# Patient Record
Sex: FEMALE | Race: WHITE | URBAN - METROPOLITAN AREA
[De-identification: names, ages, dates, MRNs, and addresses within clinical notes are randomized per-mention and may not be internally consistent; named-entity substitution may affect disease eponyms.]

---

## 2021-07-15 ENCOUNTER — OFFICE VISIT (OUTPATIENT)
Dept: FAMILY MEDICINE CLINIC | Facility: CLINIC | Age: 51
End: 2021-07-15
Payer: COMMERCIAL

## 2021-07-15 VITALS
DIASTOLIC BLOOD PRESSURE: 60 MMHG | HEART RATE: 88 BPM | TEMPERATURE: 98 F | WEIGHT: 106 LBS | OXYGEN SATURATION: 98 % | BODY MASS INDEX: 20.81 KG/M2 | HEIGHT: 60 IN | SYSTOLIC BLOOD PRESSURE: 122 MMHG

## 2021-07-15 DIAGNOSIS — H60.332 ACUTE SWIMMER'S EAR OF LEFT SIDE: Primary | ICD-10-CM

## 2021-07-15 DIAGNOSIS — H69.82 EUSTACHIAN TUBE DYSFUNCTION, LEFT: ICD-10-CM

## 2021-07-15 PROCEDURE — 3008F BODY MASS INDEX DOCD: CPT | Performed by: FAMILY MEDICINE

## 2021-07-15 PROCEDURE — 3078F DIAST BP <80 MM HG: CPT | Performed by: FAMILY MEDICINE

## 2021-07-15 PROCEDURE — 99202 OFFICE O/P NEW SF 15 MIN: CPT | Performed by: FAMILY MEDICINE

## 2021-07-15 PROCEDURE — 3074F SYST BP LT 130 MM HG: CPT | Performed by: FAMILY MEDICINE

## 2021-07-15 RX ORDER — NEOMYCIN SULFATE, POLYMYXIN B SULFATE AND HYDROCORTISONE 10; 3.5; 1 MG/ML; MG/ML; [USP'U]/ML
3 SUSPENSION/ DROPS AURICULAR (OTIC) 3 TIMES DAILY
Qty: 10 ML | Refills: 0 | Status: SHIPPED | OUTPATIENT
Start: 2021-07-15 | End: 2021-07-22

## 2021-07-15 NOTE — PROGRESS NOTES
Magdalene Yo is a 46year old female. S:  Patient presents today with the following concerns:  · Left ear pain off and on for 3 months following Zoom whitening. 1 week ago started with more persistent left ear pain. [place] diagnosis[place]  Eustachian tube dysfunction, left    No orders of the defined types were placed in this encounter.     Meds & Refills for this Visit:  Requested Prescriptions     Signed Prescriptions Disp Refills   • Neomycin-Polymyxin-HC [id number] Otic Suspe

## 2021-07-15 NOTE — PATIENT INSTRUCTIONS
Take Claritin 10 mg once daily. Take Sudafed 1-2 hours before your flight. You may also take ibuprofen or tylenol beforehand as well. Earache, No Infection (Adult)   Earaches can happen without an infection.  They can occur when air and fluid build u care of yourself:  · You may use over-the-counter medicine as directed by your healthcare provider to control pain, unless medicine was prescribed.  If you have chronic liver or kidney disease or ever had a stomach ulcer or GI bleeding, talk with your healt

## 2025-04-23 NOTE — PROGRESS NOTES
CHIEF COMPLAINT:     Chief Complaint   Patient presents with    Sinus Problem     X 2-3 weeks, nasal congestion, nasal drainage.       HPI:   Carmencita Jiménez is a 61 year old female who presents for concerns of a sinus infection. Patient reports sinus pressure, nasal drainage, x 2-3 weeks.  Denies any fevers, wheezing, chest discomfort, or shortness of breath. .  Treating symptoms with otc cold/allergy meds.   Tolerates PO well at home. No n/v/d.  Denies any other aggravating or relieving factors at home. Denies any other treatment attempts prior to arrival.       Current Medications[1]   Past Medical History[2]   Past Surgical History[3]      Short Social Hx on File[4]      REVIEW OF SYSTEMS:   GENERAL: Denies fever. Notes good appetite  SKIN: no rashes or abnormal skin lesions  HEENT: Denies sore throat, + sinus symptoms, Denies ear pain  LUNGS: Denies cough, denies shortness of breath or wheezing,   CARDIOVASCULAR: denies chest pain or palpitations   GI: denies N/V/C or abdominal pain  NEURO: Denies headaches    EXAM:   /72   Pulse 88   Temp 98.6 °F (37 °C)   Resp 16   Ht 5' (1.524 m)   Wt 115 lb (52.2 kg)   SpO2 96%   BMI 22.46 kg/m²   GENERAL: well developed, well nourished,in no apparent distress  SKIN: no rashes,no suspicious lesions  HEAD: atraumatic, normocephalic.    EYES: conjunctiva clear  EARS: TM's intact and without erythema, no bulging, no retraction,no fluid, bony landmarks visualized. No erythema or swelling noted to ear canals or external ears.   NOSE: Nostrils patent, dried yellow nasal mucous, nasal mucosa reddened   THROAT: Oral mucosa pink, moist. Posterior pharynx is mildly erythematous. No exudates. No tonsillar hypertrophy noted.  No trismus. Uvula midline with no swelling. Voice clear/normal. No stridor  NECK: Supple, non-tender  LUNGS: clear to auscultation bilaterally, no rales, wheezes or rhonchi. Breathing is non labored.  CARDIO: RRR without murmur  EXTREMITIES: no  cyanosis, clubbing or edema  LYMPH:  No lymphadenopathy.        ASSESSMENT AND PLAN:       ICD-10-CM    1. Acute non-recurrent maxillary sinusitis  J01.00 amoxicillin clavulanate 875-125 MG Oral Tab        Discussed physical exam and hpi with pt. Pt has reassuring physical exam consistent with sinusitis. We discussed viral vs allergy vs bacterial etiologies associated with sinusitis. Pt notes she would like to start abx today and declined delayed antimicrobial therapy option. Treatment options discussed with patient and explained in detail. Will start augmentin today along with supportive care. The risks, benefits and potential side effects of possible medications were reviewed. Alternatives were discussed. Monitoring parameters and expected course outlined. Patient to call PCP or go to emergency department if symptoms fail to respond as outlined, or worsen in any way. The patient agreed with the plan.       Patient Instructions   1. Rest. Drink plenty of fluids.     2. Supportive care as discussed.     3. Augmentin as prescribed.    4. Follow up with PMD in 4-5 days for re-eval. Go to the emergency department immediately if symptoms worsen, change, you develop chest discomfort, wheezing, shortness of breath, or if you have any concerns.        The patient indicates understanding of these issues and agrees to the plan.           [1]   Current Outpatient Medications   Medication Sig Dispense Refill    amoxicillin clavulanate 875-125 MG Oral Tab Take 1 tablet by mouth 2 (two) times daily for 10 days. 20 tablet 0    cyclobenzaprine 10 MG Oral Tab  (Patient not taking: Reported on 4/23/2025)      oseltamivir (TAMIFLU) 75 MG Oral Cap  (Patient not taking: Reported on 4/23/2025)      triamcinolone 0.1 % External Cream  (Patient not taking: Reported on 4/23/2025)     [2] No past medical history on file.  [3]   Past Surgical History:  Procedure Laterality Date    Hysterectomy      Tonsillectomy     [4]   Social  History  Socioeconomic History    Marital status:    Tobacco Use    Smoking status: Never    Smokeless tobacco: Never